# Patient Record
Sex: MALE | Race: WHITE | Employment: STUDENT | URBAN - METROPOLITAN AREA
[De-identification: names, ages, dates, MRNs, and addresses within clinical notes are randomized per-mention and may not be internally consistent; named-entity substitution may affect disease eponyms.]

---

## 2020-11-04 ENCOUNTER — PATIENT OUTREACH (OUTPATIENT)
Dept: PLASTIC SURGERY | Facility: CLINIC | Age: 25
End: 2020-11-04

## 2020-11-04 NOTE — PROGRESS NOTES
[facility]:  Care Coordination Note     SITUATION   Patient (Mark, she/her)  is a 25 year old who is receiving support for:  Consult For (Breast augmentation)  .    BACKGROUND     Pt scheduled for breast augmentation consult.     ASSESSMENT     Surgery              [facility] [place]  [facility] [place]ment: Enrolled  Patient has a therapist: Yes(pt has therapist but not gender competent necessarily)  Letter of support #1: Requested  Surgery being considered: Yes  Augmentation: Yes  Hormones at least 12mo: Yes(Hormones prescribed at [facility], started 10/2019)          PLAN          Nursing Interventions:   [facility] program and services discussed with patient. Educational surgical packet provided and reviewed with patient. Process for accessing surgery discussed, including: WPATH standards of care, letters of support, treatment plan action steps, [place] insurance process, surgery scheduling, and approximate timeline.      Pt questions answered within scope of practice.     Follow-up plan:    1. Pt to discuss letter with current therapist.   2. Pt to attend consult.        Slick Cummins

## 2021-02-08 NOTE — TELEPHONE ENCOUNTER
FUTURE VISIT INFORMATION      FUTURE VISIT INFORMATION:    Date: 5.7.21    Time: 8 AM    Location: [facility]  REFERRAL INFORMATION:    Referring provider:  N/A    Referring providers clinic:  N/A    Reason for visit/diagnosis  Breast augmentation    RECORDS REQUESTED FROM:

## 2021-02-11 ENCOUNTER — TELEPHONE (OUTPATIENT)
Dept: PLASTIC SURGERY | Facility: CLINIC | Age: 26
End: 2021-02-11

## 2021-02-11 DIAGNOSIS — F64.0 GENDER DYSPHORIA IN ADOLESCENT AND ADULT: Primary | ICD-10-CM

## 2021-02-11 NOTE — TELEPHONE ENCOUNTER
[facility] Call Center    Phone Message    May a detailed message be left on voicemail: yes     Reason for Call: Appointment Intake    Referring Provider Name: self, unknown  Diagnosis and/or Symptoms: discuss MTF bottom surgery    Patient can be reached at [phone] or email [email or web address]. Thanks!    Action Taken: Message routed to:  [facility]astics / Gender Care    Travel Screening: Not Applicable

## 2021-02-11 NOTE — TELEPHONE ENCOUNTER
[facility] :  Care Coordination Note     SITUATION   Pt (Mark, she/her) is a 25 year old adult who is receiving support for:  Appointment (Appointment Request for Plastic Surgery / Gender Care)  .    BACKGROUND     Called pt regarding appt request for vaginoplasty. Discussed process, 2 LOS requirement. Pt to obtain 2 LOS and call back to schedule.     ASSESSMENT     Surgery              [facility] Assessment  [facility] ([facility] Enrollment: Enrolled  Patient has a therapist: Yes  Name of therapist: Pt doesn't remember at the moment  Letter of support #1: Requested  Letter of support #2: Requested  Surgery being considered: Yes  Augmentation: Yes  Hormones at least 12mo: Yes  Vaginoplasty: Yes    Pt reports:    No smoking  No diabetes  HRT for 1 year  No previous gender affirming surgeries  Has a therapist, writer to send referrals for 2nd LOS    Pt to see Dr. Anderson for breast aug consult 5/7/21.       PLAN          Nursing Interventions:  [facility] assessment completed    Follow-up plan:    1. Obtain 1 LOS breast aug, 2 LOS vaginoplasty    2. Writer to send LOS referrals via [employer]    3. Schedule consult with Dr. Chaney.        Neil Howe

## 2021-03-03 ENCOUNTER — TELEPHONE (OUTPATIENT)
Dept: PLASTIC SURGERY | Facility: CLINIC | Age: 26
End: 2021-03-03

## 2021-03-03 NOTE — TELEPHONE ENCOUNTER
[facility] :  Care Coordination Note     SITUATION   Pt (Katherine, she/her) is a 25 year old adult who is receiving support for:  Care Team  .    BACKGROUND     Pt called back regarding breast aug and FGCS interest. Pt scheduled FGCS consult 6/11/21, writer to check if pt can see Dr. Anderson for both during 5/7/21 appt.     ASSESSMENT     Surgery              [facility] Assessment  [facility] ([facility] Enrollment: Enrolled  Patient has a therapist: Yes  Name of therapist: Pt doesn't remember at the moment  Letter of support #1: Requested  Letter of support #2: Requested  Surgery being considered: Yes  Augmentation: Yes  Hormones at least 12mo: Yes  Vaginoplasty: Yes  FGCS: Yes        PLAN          Nursing Interventions:  [facility] assessment completed    Follow-up plan:    1. Writer to check with clinic staff re: 5/7 dual appt    2. Obtain KERRY Howe

## 2021-03-03 NOTE — TELEPHONE ENCOUNTER
Pt called regarding interest in breast aug and FGCS. Pt is already scheduled for breast aug consultation with Dr. Anderson 5/7/21. Called pt to discuss, no answer, mailbox full. Pt not signed up for [facility].     Neil Howe

## 2021-03-11 NOTE — TELEPHONE ENCOUNTER
FUTURE VISIT INFORMATION      FUTURE VISIT INFORMATION:    Date: 6/11/21    Time: 10:30 AM    Location: [facility]  REFERRAL INFORMATION:    Referring provider:      Referring providers clinic:      Reason for visit/diagnosis: Female Genitalia Cosmetic Surgery    RECORDS REQUESTED FROM:       Clinic name Comments Records Status Imaging Status   [facility] 12/31/20 - [facility] VV with Dr. Thomas [facility] Everywhere

## 2021-03-23 ENCOUNTER — TELEPHONE (OUTPATIENT)
Dept: PLASTIC SURGERY | Facility: CLINIC | Age: 26
End: 2021-03-23

## 2021-03-23 NOTE — TELEPHONE ENCOUNTER
Called pt, no answer, LVM with information regarding Dr. Chaney's departure, next steps regarding 2 LOS requirement. Writer to send pt [facility] message as well.     Neil Howe

## 2021-03-23 NOTE — TELEPHONE ENCOUNTER
[facility] Call Center    Phone Message    May a detailed message be left on voicemail: yes     Reason for Call: Appointment Intake    Referring Provider Name: self, unknown  Diagnosis and/or Symptoms: Schedule consultation for mtf bottom surgery     Patient sent in the following online appointment request yesterday. Please advise and contact patient at [phone]. Thanks!    Action Taken: Message routed to:  [facility] Plastic Surgery / Gender Care    Travel Screening: Not Applicable

## 2021-05-01 ENCOUNTER — HEALTH MAINTENANCE LETTER (OUTPATIENT)
Age: 26
End: 2021-05-01

## 2021-05-07 ENCOUNTER — PRE VISIT (OUTPATIENT)
Dept: PLASTIC SURGERY | Facility: CLINIC | Age: 26
End: 2021-05-07

## 2021-06-11 ENCOUNTER — PRE VISIT (OUTPATIENT)
Dept: PLASTIC SURGERY | Facility: CLINIC | Age: 26
End: 2021-06-11

## 2021-10-11 ENCOUNTER — HEALTH MAINTENANCE LETTER (OUTPATIENT)
Age: 26
End: 2021-10-11

## 2022-05-22 ENCOUNTER — HEALTH MAINTENANCE LETTER (OUTPATIENT)
Age: 27
End: 2022-05-22

## 2022-09-24 ENCOUNTER — HEALTH MAINTENANCE LETTER (OUTPATIENT)
Age: 27
End: 2022-09-24

## 2023-06-04 ENCOUNTER — HEALTH MAINTENANCE LETTER (OUTPATIENT)
Age: 28
End: 2023-06-04